# Patient Record
Sex: MALE | Race: WHITE
[De-identification: names, ages, dates, MRNs, and addresses within clinical notes are randomized per-mention and may not be internally consistent; named-entity substitution may affect disease eponyms.]

---

## 2018-07-18 ENCOUNTER — HOSPITAL ENCOUNTER (EMERGENCY)
Dept: HOSPITAL 74 - JER | Age: 53
Discharge: HOME | End: 2018-07-18
Payer: COMMERCIAL

## 2018-07-18 VITALS — DIASTOLIC BLOOD PRESSURE: 75 MMHG | TEMPERATURE: 97.5 F | HEART RATE: 68 BPM | SYSTOLIC BLOOD PRESSURE: 131 MMHG

## 2018-07-18 VITALS — BODY MASS INDEX: 38.7 KG/M2

## 2018-07-18 DIAGNOSIS — J34.89: Primary | ICD-10-CM

## 2018-07-18 DIAGNOSIS — F41.0: ICD-10-CM

## 2018-07-18 NOTE — PDOC
*Physical Exam





- Vital Signs


 Last Vital Signs











Temp Pulse Resp BP Pulse Ox


 


 97.5 F L  68   18   131/75   99 


 


 07/18/18 22:29  07/18/18 22:29  07/18/18 22:29  07/18/18 22:29  07/18/18 22:29














Medical Decision Making





- Medical Decision Making





07/18/18 23:55


agree with care from BERNY Walker





*DC/Admit/Observation/Transfer


Diagnosis at time of Disposition: 


 Sinus problem, Panic attack








- Discharge Dispostion


Disposition: HOME


Condition at time of disposition: Stable





- Prescriptions


Prescriptions: 


Diazepam [Valium] 1 - 2 tab PO HS PRN #20 tablet MDD 2


 PRN Reason: Anxiety


Diclofenac Sodium 75 mg PO BID #20 tablet.dr





- Referrals


Referrals: 


Jerson Sears [Primary Care Provider] - 





- Patient Instructions


Printed Discharge Instructions:  Sinusitis (Alternative Therapy), DI for 

Sinusitis, DI for Panic Disorder


Additional Instructions: 


Please take medications as prescribed and follow up with your ENT within the 

next 3-5 days.  If you develop any swelling to your throat, tongue, lips, mouth

, neck, or have any new or worsening symptoms, please return to the ER 

immediately. 





- Post Discharge Activity

## 2018-07-18 NOTE — PDOC
History of Present Illness





- General


Chief Complaint: Cold Symptoms


Stated Complaint: RESPIRATORY


Time Seen by Provider: 07/18/18 22:34





- History of Present Illness


Initial Comments: 





07/18/18 23:32


CHIEF COMPLAINT: panic attacks due to sinus problems





HISTORY OF PRESENT ILLNESS:  52 yo M with hx of sinus problems (s/p 

corticosteroid injection 4 days ago) presents to ED with "panic attacks because 

I feel like I can't breathe through my nose."  He reports he has not slept in 4 

days due to the intermittent of his sinus swelling that wakes him up and "

blocks my ability to breathe and I have to breathe through my mouth."  Patient 

states he can not take steroids due to "roid rage." 








PAST MEDICAL HISTORY: Denies past medical history





FAMILY HISTORY: Denies





SOCIAL HISTORY: Denies tobacco, alcohol, illicit drug use. 





SURGICAL HISTORY: Denies





ALLERGIES: No known drug allergies





REVIEW OF SYSTEMS


General/Constitutional: Denies fever or chills. Denies weakness.





HEENT: "My sinuses keep opening and closing and I can't sleep." Denies change 

in vision. Denies ear pain or discharge. Denies sore throat.





Cardiovascular: Denies chest pain or shortness of breath.





Respiratory: Denies cough, wheezing, or hemoptysis.





Gastrointestinal: Denies nausea, vomiting, diarrhea or constipation. Denies 

rectal bleeding.





Neurologic: Denies headache, vertigo, loss of consciousness, or loss of 

sensation.





Psychiatric: Panic attacks. 





PHYSICAL EXAM


General Appearance: Well-appearing, appropriately dressed.  No apparent distress

, no intoxication.





HEENT: EOMI, PERRLA, normal ENT inspection, normal voice, TMs normal, pharynx 

normal.  No conjunctival pallor.  No photophobia, scleral icterus.





Respiratory/Chest: Lungs CTAB.  No shortness of breath, chest tenderness, 

respiratory distress, accessory muscle use. No crackles, rales, rhonchi, stridor

, wheezing, dullness





Cardiovascular: RRR. S1, S2. 





Gastrointestinal/Abdominal: Normal bowel sounds.  Abdomen soft, non-distended.  

No tenderness or rebound tenderness. No  organomegaly, pulsatile mass, guarding

, hernia, hepatomegaly, splenomegaly.





Musculoskeletal/Extremities:  Normal inspection. FROM of all extremities, 

normal capillary refill.  Pelvis Stable.  No CVA tenderness. No tenderness to 

extremities, pedal edema, swelling, erythema or deformity.





Integumentary: Appropriate color, dry, warm.  No cyanosis, erythema, jaundice 

or rash





Neurologic: CNs II-XII intact. Fully oriented, alert.  Appropriate mood/affect. 

Motor strength 5/5.  No appreciable EOM palsy, facial droop or sensory deficit.





Psychiatric:  Patient anxious appearing, pacing around exam room.











Past History





- Past Medical History


Allergies/Adverse Reactions: 


 Allergies











Allergy/AdvReac Type Severity Reaction Status Date / Time


 


barley Allergy   Verified 07/18/18 22:32


 


Penicillins Allergy  Itching Verified 07/18/18 22:32


 


BAKERS YEAST Allergy   Uncoded 07/18/18 22:32











Home Medications: 


Ambulatory Orders





Omeprazole Magnesium [Prilosec (OTC)] 40 mg PO DAILY 07/15/15 


Simvastatin [Zocor -] 40 mg PO HS 07/15/15 


Allopurinol 300 mg PO DAILY 07/18/18 


Buspirone HCl [Buspar -] 5 mg PO QID 07/18/18 


Diazepam [Valium] 1 - 2 tab PO HS PRN #20 tablet MDD 2 07/18/18 


Diclofenac Sodium 75 mg PO BID #20 tablet. 07/18/18 


Triamterene-Hctz 37.5-25 mg Tb 1 tab PO DAILY 07/18/18 








COPD: No


Hypercholesterolemia: Yes





- Suicide/Smoking/Psychosocial Hx


Smoking History: Never smoked


Hx Alcohol Use: Yes (SOCIAL)





*Physical Exam





- Vital Signs


 Last Vital Signs











Temp Pulse Resp BP Pulse Ox


 


 97.5 F L  68   18   131/75   99 


 


 07/18/18 22:29  07/18/18 22:29  07/18/18 22:29  07/18/18 22:29  07/18/18 22:29














Medical Decision Making





- Medical Decision Making





07/18/18 23:40


 52 yo M with hx of sinus problems (s/p corticosteroid injection 4 days ago) 

presents to ED with "panic attacks because I feel like I can't breathe through 

my nose."





Patient anxious, otherwise physical exam grossly unremarkable 





Patient refuses steroids. 





Will rx NSAIDS and Valium





Advised patient to take medication as prescribed and follow up with ENT within 

the next week.  Advised patient of signs and symptoms for return to ED.  

Patient verbalized understanding and agrees to plan.











*DC/Admit/Observation/Transfer


Diagnosis at time of Disposition: 


 Sinus problem, Panic attack








- Discharge Dispostion


Disposition: HOME


Condition at time of disposition: Stable


Decision to Admit order: No





- Prescriptions


Prescriptions: 


Diazepam [Valium] 1 - 2 tab PO HS PRN #20 tablet MDD 2


 PRN Reason: Anxiety


Diclofenac Sodium 75 mg PO BID #20 tablet.dr





- Referrals


Referrals: 


Jerson Sears [Primary Care Provider] - 





- Patient Instructions


Printed Discharge Instructions:  Sinusitis (Alternative Therapy), DI for 

Sinusitis, DI for Panic Disorder


Additional Instructions: 


Please take medications as prescribed and follow up with your ENT within the 

next 3-5 days.  If you develop any swelling to your throat, tongue, lips, mouth

, neck, or have any new or worsening symptoms, please return to the ER 

immediately. 





- Post Discharge Activity